# Patient Record
Sex: MALE | Race: WHITE | NOT HISPANIC OR LATINO
[De-identification: names, ages, dates, MRNs, and addresses within clinical notes are randomized per-mention and may not be internally consistent; named-entity substitution may affect disease eponyms.]

---

## 2021-05-19 PROBLEM — Z00.00 ENCOUNTER FOR PREVENTIVE HEALTH EXAMINATION: Status: ACTIVE | Noted: 2021-05-19

## 2021-06-02 ENCOUNTER — APPOINTMENT (OUTPATIENT)
Dept: HEART AND VASCULAR | Facility: CLINIC | Age: 71
End: 2021-06-02
Payer: MEDICARE

## 2021-06-02 DIAGNOSIS — F01.50 VASCULAR DEMENTIA W/OUT BEHAVIORAL DISTURBANCE: ICD-10-CM

## 2021-06-02 DIAGNOSIS — Z86.59 PERSONAL HISTORY OF OTHER MENTAL AND BEHAVIORAL DISORDERS: ICD-10-CM

## 2021-06-02 DIAGNOSIS — I35.0 NONRHEUMATIC AORTIC (VALVE) STENOSIS: ICD-10-CM

## 2021-06-02 PROCEDURE — 99213 OFFICE O/P EST LOW 20 MIN: CPT | Mod: 95

## 2021-06-02 NOTE — REVIEW OF SYSTEMS
[Fever] : no fever [SOB] : no shortness of breath [Dyspnea on exertion] : not dyspnea during exertion [Leg Claudication] : no intermittent leg claudication [Cough] : no cough [Abdominal Pain] : no abdominal pain [Change in Appetite] : no change in appetite [Urinary Frequency] : no change in urinary frequency [Joint Pain] : no joint pain [Rash] : no rash [Dizziness] : no dizziness [Convulsions] : no convulsions [Confusion] : confusion was observed [Memory Lapses Or Loss] : memory lapses or loss

## 2021-06-02 NOTE — HISTORY OF PRESENT ILLNESS
[FreeTextEntry1] : History obtained telehealth. Daughter 69 y/o M with dementia (moderate, told he has vascular dementia), HLD, HTN, severe aortic stenois referred for TAVR evaluation. Currently the patient is asymptomatic. The pt rarely does activity and is mostly 'sitting around the house' all day. His daughter was the one giving the history as pt unable to give a history. Pt has denied any symptoms and his daughter says that breathing is not an issue for the pt. No LH/syncope/chest pain.\par \par \par Medications:\par Aricept 10mg\par Diltizem 240mg ER\par Rosuvastatin 10mg\par Prozac 10mg\par Amlodipine 5mg daily

## 2021-06-02 NOTE — ASSESSMENT
[FreeTextEntry1] : 69 y/o M with dementia who presents with AS for evaluation. Currently, pt is NYHA 1, and his main limitation is neurologic status (per the daughter). Given that pt has no symptoms and limitation is dementia we discussed holding off any procedure at this point.\par -RTC in 1 month for in-person visit\par -medical management\par -F/u with Dr. Orellana

## 2021-07-19 ENCOUNTER — APPOINTMENT (OUTPATIENT)
Dept: HEART AND VASCULAR | Facility: CLINIC | Age: 71
End: 2021-07-19
Payer: MEDICARE

## 2021-07-19 VITALS
SYSTOLIC BLOOD PRESSURE: 151 MMHG | OXYGEN SATURATION: 98 % | BODY MASS INDEX: 24.71 KG/M2 | DIASTOLIC BLOOD PRESSURE: 80 MMHG | HEART RATE: 63 BPM | WEIGHT: 163 LBS | TEMPERATURE: 97.2 F | HEIGHT: 68 IN

## 2021-07-19 PROCEDURE — 99213 OFFICE O/P EST LOW 20 MIN: CPT

## 2021-07-19 NOTE — PHYSICAL EXAM
[Clear Lung Fields] : clear lung fields [Soft] : abdomen soft [No Rash] : no rash [Moves all extremities] : moves all extremities [Cognitive Impairment] : cognitive impairment [Well Developed] : well developed [Well Nourished] : well nourished [No Acute Distress] : no acute distress [Normal Venous Pressure] : normal venous pressure [No Carotid Bruit] : no carotid bruit [Normal Gait] : normal gait [No Edema] : no edema [No Focal Deficits] : no focal deficits [Normal Speech] : normal speech [de-identified] : +BEATRIZ with soft S2 [de-identified] : AAOx0, unable to provide history. Does not know where he is or who is accompanying him. However, pt is able to speak in full sentences

## 2021-07-19 NOTE — HISTORY OF PRESENT ILLNESS
[FreeTextEntry1] : 69 y/o M with dementia, HLD, HTN, severe aortic stenosis referred for TAVR evaluation. Currently the patient is asymptomatic. The pt rarely does activity and is mostly 'sitting around the house' all day. His daughter was the one giving the history as pt unable to give a history. Pt has denied any symptoms and his daughter says that breathing is not an issue for the pt. No LH/syncope/chest pain.\par His TTE in May showed an ALICE 0.5cm2, AV max PG 66mmHg with AV peak velocitiy 4 m/s.\par We had a visit in May over telehealth in which he was asymptomatic and today he presents for in-person visit with his daughter.\par Today pt denies any CP/SOB. His daughter states that his memory loss has worsened, and he no longer remembers his family members name, location or date.\par The pt is able to converse she states, but is confused and doesn’t know where he is. He still does yard-work with no SOB per the daughter. No syncope

## 2021-07-19 NOTE — ASSESSMENT
[FreeTextEntry1] : 70 y/o with severe dementia, chronic diastolic heart failure and severe AS, NYHA 1 symptoms.\par -due to his advanced dementia, I do not feel that the pt is a good TAVR candidate. At this point, he is asymptomatic and no intervention required.\par -Utility of BAV does not seem useful at this point, as it does not appear that his QOL is affected from his valvular disease\par -medical management at this point, and will re-assess symptoms. \par -we spoke about the disease and options for disease progression in great detail.

## 2021-07-19 NOTE — REVIEW OF SYSTEMS
[Memory Lapses Or Loss] : memory lapses or loss [Fever] : no fever [Blurry Vision] : no blurred vision [SOB] : no shortness of breath [Dyspnea on exertion] : not dyspnea during exertion [Chest Discomfort] : no chest discomfort [Cough] : no cough [Abdominal Pain] : no abdominal pain [Urinary Frequency] : no change in urinary frequency [Rash] : no rash [Dizziness] : no dizziness [Confusion] : confusion was observed [Easy Bleeding] : no tendency for easy bleeding

## 2022-11-09 ENCOUNTER — NON-APPOINTMENT (OUTPATIENT)
Age: 72
End: 2022-11-09

## 2022-11-09 ENCOUNTER — RESULT REVIEW (OUTPATIENT)
Age: 72
End: 2022-11-09

## 2022-11-11 ENCOUNTER — APPOINTMENT (OUTPATIENT)
Dept: CARDIOTHORACIC SURGERY | Facility: CLINIC | Age: 72
End: 2022-11-11

## 2022-11-11 ENCOUNTER — NON-APPOINTMENT (OUTPATIENT)
Age: 72
End: 2022-11-11

## 2022-11-11 VITALS
BODY MASS INDEX: 24.71 KG/M2 | SYSTOLIC BLOOD PRESSURE: 130 MMHG | TEMPERATURE: 97.8 F | HEIGHT: 68 IN | DIASTOLIC BLOOD PRESSURE: 80 MMHG | OXYGEN SATURATION: 98 % | HEART RATE: 73 BPM | WEIGHT: 163 LBS

## 2022-11-11 PROCEDURE — 99202 OFFICE O/P NEW SF 15 MIN: CPT

## 2022-11-14 RX ORDER — DILTIAZEM HYDROCHLORIDE 120 MG/1
120 CAPSULE, EXTENDED RELEASE ORAL
Qty: 30 | Refills: 0 | Status: ACTIVE | COMMUNITY

## 2022-11-14 RX ORDER — ROSUVASTATIN CALCIUM 10 MG/1
10 TABLET, FILM COATED ORAL DAILY
Refills: 0 | Status: ACTIVE | COMMUNITY

## 2022-11-14 RX ORDER — LORAZEPAM 2 MG/1
2 TABLET ORAL 3 TIMES DAILY
Refills: 0 | Status: ACTIVE | COMMUNITY

## 2022-11-14 RX ORDER — AMLODIPINE BESYLATE 5 MG/1
5 TABLET ORAL
Qty: 30 | Refills: 0 | Status: ACTIVE | COMMUNITY

## 2022-11-14 RX ORDER — FLUOXETINE HYDROCHLORIDE 20 MG/1
20 CAPSULE ORAL DAILY
Refills: 0 | Status: ACTIVE | COMMUNITY

## 2022-11-28 ENCOUNTER — TRANSCRIPTION ENCOUNTER (OUTPATIENT)
Age: 72
End: 2022-11-28

## 2022-11-28 ENCOUNTER — NON-APPOINTMENT (OUTPATIENT)
Age: 72
End: 2022-11-28

## 2022-11-28 VITALS
OXYGEN SATURATION: 99 % | RESPIRATION RATE: 18 BRPM | DIASTOLIC BLOOD PRESSURE: 69 MMHG | HEIGHT: 68 IN | TEMPERATURE: 97 F | WEIGHT: 162.92 LBS | HEART RATE: 67 BPM | SYSTOLIC BLOOD PRESSURE: 130 MMHG

## 2022-11-28 RX ORDER — RIVAROXABAN 15 MG-20MG
1 KIT ORAL
Qty: 0 | Refills: 0 | DISCHARGE

## 2022-11-28 NOTE — PATIENT PROFILE ADULT - FALL HARM RISK - HARM RISK INTERVENTIONS

## 2022-11-28 NOTE — PHYSICAL EXAM
[Well Developed] : well developed [Well Nourished] : well nourished [No Acute Distress] : no acute distress [Normal Venous Pressure] : normal venous pressure [No Carotid Bruit] : no carotid bruit [Clear Lung Fields] : clear lung fields [Soft] : abdomen soft [Normal Gait] : normal gait [No Edema] : no edema [No Rash] : no rash [Moves all extremities] : moves all extremities [No Focal Deficits] : no focal deficits [Normal Speech] : normal speech [Cognitive Impairment] : cognitive impairment [de-identified] : +BEATRIZ with soft S2 [de-identified] : AAOx0, unable to provide history. Does not know where he is or who is accompanying him. However, pt is able to speak in full sentences

## 2022-11-28 NOTE — HISTORY OF PRESENT ILLNESS
[FreeTextEntry1] : *History obtained from patient's wife \par \par 72 year old male with hypertension, hyperlipidemia, Dementia, Non-obstructive CAD, chronic diastolic heart failure with severe aortic stenosis who was referred for further evaluation of his valvular disease. \par \par Patient states he is feeling well and does admit to mild fatigue.  He denies any SOB at rest or with exertion, chest pain,  palpitations, dizziness, or syncope. \par \par ECHO on 10/18/2022-  severe aortic stenosis with peak gradient 111 mg/mean gradient 71 mmHg. \par \par The patient lives with his wife who assists him with all his ADLs.

## 2022-11-29 ENCOUNTER — APPOINTMENT (OUTPATIENT)
Dept: CARDIOTHORACIC SURGERY | Facility: HOSPITAL | Age: 72
End: 2022-11-29

## 2022-11-29 ENCOUNTER — INPATIENT (INPATIENT)
Facility: HOSPITAL | Age: 72
LOS: 0 days | Discharge: HOME | DRG: 266 | End: 2022-11-30
Attending: INTERNAL MEDICINE | Admitting: INTERNAL MEDICINE
Payer: MEDICARE

## 2022-11-29 ENCOUNTER — TRANSCRIPTION ENCOUNTER (OUTPATIENT)
Age: 72
End: 2022-11-29

## 2022-11-29 ENCOUNTER — NON-APPOINTMENT (OUTPATIENT)
Age: 72
End: 2022-11-29

## 2022-11-29 DIAGNOSIS — Z95.0 PRESENCE OF CARDIAC PACEMAKER: Chronic | ICD-10-CM

## 2022-11-29 LAB
A1C WITH ESTIMATED AVERAGE GLUCOSE RESULT: 6.3 % — HIGH (ref 4–5.6)
ALBUMIN SERPL ELPH-MCNC: 4.7 G/DL — SIGNIFICANT CHANGE UP (ref 3.3–5)
ALP SERPL-CCNC: 123 U/L — HIGH (ref 40–120)
ALT FLD-CCNC: 29 U/L — SIGNIFICANT CHANGE UP (ref 10–45)
ANION GAP SERPL CALC-SCNC: 8 MMOL/L — SIGNIFICANT CHANGE UP (ref 5–17)
ANION GAP SERPL CALC-SCNC: 9 MMOL/L — SIGNIFICANT CHANGE UP (ref 5–17)
APTT BLD: 30 SEC — SIGNIFICANT CHANGE UP (ref 27.5–35.5)
APTT BLD: 31.1 SEC — SIGNIFICANT CHANGE UP (ref 27.5–35.5)
AST SERPL-CCNC: 28 U/L — SIGNIFICANT CHANGE UP (ref 10–40)
BASE EXCESS BLDA CALC-SCNC: 0.8 MMOL/L — SIGNIFICANT CHANGE UP (ref -2–3)
BILIRUB SERPL-MCNC: 0.3 MG/DL — SIGNIFICANT CHANGE UP (ref 0.2–1.2)
BLD GP AB SCN SERPL QL: NEGATIVE — SIGNIFICANT CHANGE UP
BLD GP AB SCN SERPL QL: NEGATIVE — SIGNIFICANT CHANGE UP
BUN SERPL-MCNC: 11 MG/DL — SIGNIFICANT CHANGE UP (ref 7–23)
BUN SERPL-MCNC: 9 MG/DL — SIGNIFICANT CHANGE UP (ref 7–23)
CA-I BLDA-SCNC: 1.17 MMOL/L — SIGNIFICANT CHANGE UP (ref 1.15–1.33)
CALCIUM SERPL-MCNC: 8.7 MG/DL — SIGNIFICANT CHANGE UP (ref 8.4–10.5)
CALCIUM SERPL-MCNC: 9.8 MG/DL — SIGNIFICANT CHANGE UP (ref 8.4–10.5)
CHLORIDE SERPL-SCNC: 102 MMOL/L — SIGNIFICANT CHANGE UP (ref 96–108)
CHLORIDE SERPL-SCNC: 102 MMOL/L — SIGNIFICANT CHANGE UP (ref 96–108)
CO2 BLDA-SCNC: 26 MMOL/L — HIGH (ref 19–24)
CO2 SERPL-SCNC: 26 MMOL/L — SIGNIFICANT CHANGE UP (ref 22–31)
CO2 SERPL-SCNC: 28 MMOL/L — SIGNIFICANT CHANGE UP (ref 22–31)
COHGB MFR BLDA: 0.7 % — SIGNIFICANT CHANGE UP
CREAT SERPL-MCNC: 0.59 MG/DL — SIGNIFICANT CHANGE UP (ref 0.5–1.3)
CREAT SERPL-MCNC: 0.67 MG/DL — SIGNIFICANT CHANGE UP (ref 0.5–1.3)
EGFR: 103 ML/MIN/1.73M2 — SIGNIFICANT CHANGE UP
EGFR: 99 ML/MIN/1.73M2 — SIGNIFICANT CHANGE UP
ESTIMATED AVERAGE GLUCOSE: 134 MG/DL — HIGH (ref 68–114)
GLUCOSE BLDA-MCNC: 141 MG/DL — HIGH (ref 70–99)
GLUCOSE SERPL-MCNC: 140 MG/DL — HIGH (ref 70–99)
GLUCOSE SERPL-MCNC: 170 MG/DL — HIGH (ref 70–99)
HCO3 BLDA-SCNC: 25 MMOL/L — SIGNIFICANT CHANGE UP (ref 21–28)
HCT VFR BLD CALC: 36.1 % — LOW (ref 39–50)
HCT VFR BLD CALC: 41.8 % — SIGNIFICANT CHANGE UP (ref 39–50)
HGB BLD-MCNC: 12 G/DL — LOW (ref 13–17)
HGB BLD-MCNC: 13.8 G/DL — SIGNIFICANT CHANGE UP (ref 13–17)
HGB BLDA-MCNC: 11.2 G/DL — LOW (ref 12.6–17.4)
INR BLD: 0.97 — SIGNIFICANT CHANGE UP (ref 0.88–1.16)
INR BLD: 1.15 — SIGNIFICANT CHANGE UP (ref 0.88–1.16)
MAGNESIUM SERPL-MCNC: 1.8 MG/DL — SIGNIFICANT CHANGE UP (ref 1.6–2.6)
MAGNESIUM SERPL-MCNC: 2 MG/DL — SIGNIFICANT CHANGE UP (ref 1.6–2.6)
MCHC RBC-ENTMCNC: 30.3 PG — SIGNIFICANT CHANGE UP (ref 27–34)
MCHC RBC-ENTMCNC: 30.7 PG — SIGNIFICANT CHANGE UP (ref 27–34)
MCHC RBC-ENTMCNC: 33 GM/DL — SIGNIFICANT CHANGE UP (ref 32–36)
MCHC RBC-ENTMCNC: 33.2 GM/DL — SIGNIFICANT CHANGE UP (ref 32–36)
MCV RBC AUTO: 91.9 FL — SIGNIFICANT CHANGE UP (ref 80–100)
MCV RBC AUTO: 92.3 FL — SIGNIFICANT CHANGE UP (ref 80–100)
METHGB MFR BLDA: 1.3 % — SIGNIFICANT CHANGE UP
NRBC # BLD: 0 /100 WBCS — SIGNIFICANT CHANGE UP (ref 0–0)
NRBC # BLD: 0 /100 WBCS — SIGNIFICANT CHANGE UP (ref 0–0)
NT-PROBNP SERPL-SCNC: 337 PG/ML — HIGH (ref 0–300)
OXYHGB MFR BLDA: 97.1 % — HIGH (ref 90–95)
PCO2 BLDA: 39 MMHG — SIGNIFICANT CHANGE UP (ref 35–48)
PH BLDA: 7.42 — SIGNIFICANT CHANGE UP (ref 7.35–7.45)
PHOSPHATE SERPL-MCNC: 2.8 MG/DL — SIGNIFICANT CHANGE UP (ref 2.5–4.5)
PLATELET # BLD AUTO: 151 K/UL — SIGNIFICANT CHANGE UP (ref 150–400)
PLATELET # BLD AUTO: 204 K/UL — SIGNIFICANT CHANGE UP (ref 150–400)
PO2 BLDA: 144 MMHG — HIGH (ref 83–108)
POTASSIUM BLDA-SCNC: 3.4 MMOL/L — LOW (ref 3.5–5.1)
POTASSIUM SERPL-MCNC: 3.9 MMOL/L — SIGNIFICANT CHANGE UP (ref 3.5–5.3)
POTASSIUM SERPL-MCNC: 4.1 MMOL/L — SIGNIFICANT CHANGE UP (ref 3.5–5.3)
POTASSIUM SERPL-SCNC: 3.9 MMOL/L — SIGNIFICANT CHANGE UP (ref 3.5–5.3)
POTASSIUM SERPL-SCNC: 4.1 MMOL/L — SIGNIFICANT CHANGE UP (ref 3.5–5.3)
PROT SERPL-MCNC: 8.1 G/DL — SIGNIFICANT CHANGE UP (ref 6–8.3)
PROTHROM AB SERPL-ACNC: 11.5 SEC — SIGNIFICANT CHANGE UP (ref 10.5–13.4)
PROTHROM AB SERPL-ACNC: 13.7 SEC — HIGH (ref 10.5–13.4)
RBC # BLD: 3.91 M/UL — LOW (ref 4.2–5.8)
RBC # BLD: 4.55 M/UL — SIGNIFICANT CHANGE UP (ref 4.2–5.8)
RBC # FLD: 12.6 % — SIGNIFICANT CHANGE UP (ref 10.3–14.5)
RBC # FLD: 12.6 % — SIGNIFICANT CHANGE UP (ref 10.3–14.5)
RH IG SCN BLD-IMP: POSITIVE — SIGNIFICANT CHANGE UP
RH IG SCN BLD-IMP: POSITIVE — SIGNIFICANT CHANGE UP
SAO2 % BLDA: 99.1 % — HIGH (ref 94–98)
SODIUM BLDA-SCNC: 137 MMOL/L — SIGNIFICANT CHANGE UP (ref 136–145)
SODIUM SERPL-SCNC: 136 MMOL/L — SIGNIFICANT CHANGE UP (ref 135–145)
SODIUM SERPL-SCNC: 139 MMOL/L — SIGNIFICANT CHANGE UP (ref 135–145)
TSH SERPL-MCNC: 2.8 UIU/ML — SIGNIFICANT CHANGE UP (ref 0.27–4.2)
WBC # BLD: 11.24 K/UL — HIGH (ref 3.8–10.5)
WBC # BLD: 8.46 K/UL — SIGNIFICANT CHANGE UP (ref 3.8–10.5)
WBC # FLD AUTO: 11.24 K/UL — HIGH (ref 3.8–10.5)
WBC # FLD AUTO: 8.46 K/UL — SIGNIFICANT CHANGE UP (ref 3.8–10.5)

## 2022-11-29 PROCEDURE — 93306 TTE W/DOPPLER COMPLETE: CPT | Mod: 26

## 2022-11-29 PROCEDURE — 93010 ELECTROCARDIOGRAM REPORT: CPT

## 2022-11-29 PROCEDURE — 71045 X-RAY EXAM CHEST 1 VIEW: CPT | Mod: 26

## 2022-11-29 PROCEDURE — 99232 SBSQ HOSP IP/OBS MODERATE 35: CPT

## 2022-11-29 PROCEDURE — 33361 REPLACE AORTIC VALVE PERQ: CPT | Mod: 62,Q0

## 2022-11-29 PROCEDURE — 93308 TTE F-UP OR LMTD: CPT | Mod: 26

## 2022-11-29 DEVICE — INTRODUCER RAABE 6F X 55CM: Type: IMPLANTABLE DEVICE | Status: FUNCTIONAL

## 2022-11-29 DEVICE — WIRE ASAHI GRAND SLAM 300CM: Type: IMPLANTABLE DEVICE | Status: FUNCTIONAL

## 2022-11-29 DEVICE — GUIDEWIRE LUNDERQUIST EXTRA-STIFF DOUBLE CURVED .035" X 260CM: Type: IMPLANTABLE DEVICE | Status: FUNCTIONAL

## 2022-11-29 DEVICE — CATH TAVR EVOLUT FX 18FR 34MM: Type: IMPLANTABLE DEVICE | Status: FUNCTIONAL

## 2022-11-29 DEVICE — INTRO MICROPUNC 4FRX10CM SS: Type: IMPLANTABLE DEVICE | Status: FUNCTIONAL

## 2022-11-29 DEVICE — WIRE GD BMW UNIV II 190CM: Type: IMPLANTABLE DEVICE | Status: FUNCTIONAL

## 2022-11-29 DEVICE — IMPLANTABLE DEVICE: Type: IMPLANTABLE DEVICE | Status: FUNCTIONAL

## 2022-11-29 DEVICE — GUIDEWIRE AMPLATZ EXTRA-STIFF CURVED .035" X 260CM: Type: IMPLANTABLE DEVICE | Status: FUNCTIONAL

## 2022-11-29 DEVICE — GLDWIRE ADVANTAGE .035X260 CM: Type: IMPLANTABLE DEVICE | Status: FUNCTIONAL

## 2022-11-29 DEVICE — CATH DX AL1 INFIN 5FRX100CM: Type: IMPLANTABLE DEVICE | Status: FUNCTIONAL

## 2022-11-29 DEVICE — SHEATH INTRODUCER TERUMO PINNACLE CORONARY 8FR X 10CM X 0.038" MINI WIRE: Type: IMPLANTABLE DEVICE | Status: FUNCTIONAL

## 2022-11-29 DEVICE — VALVE TAVR EVOLUT FX 34MM: Type: IMPLANTABLE DEVICE | Status: FUNCTIONAL

## 2022-11-29 DEVICE — GWIRE GUID  0.035INX150CM: Type: IMPLANTABLE DEVICE | Status: FUNCTIONAL

## 2022-11-29 DEVICE — PACING CATH PACEL RIGHT HEART CURVE 5FR KIT: Type: IMPLANTABLE DEVICE | Status: FUNCTIONAL

## 2022-11-29 DEVICE — LOADING SYSTEM EVOLUT FX 34MM: Type: IMPLANTABLE DEVICE | Status: FUNCTIONAL

## 2022-11-29 DEVICE — WIREGUIDE TOROFLEX .018X40CM: Type: IMPLANTABLE DEVICE | Status: FUNCTIONAL

## 2022-11-29 DEVICE — CATH DX JR4 INFIN 5FRX100CM: Type: IMPLANTABLE DEVICE | Status: FUNCTIONAL

## 2022-11-29 DEVICE — INTRODUCER SHEATH KIT GLIDESHEATH SLENDER FLEX STRAIGHT 21G 6F X 10CM: Type: IMPLANTABLE DEVICE | Status: FUNCTIONAL

## 2022-11-29 DEVICE — WIRE GD BMW UNIV II 300CM: Type: IMPLANTABLE DEVICE | Status: FUNCTIONAL

## 2022-11-29 DEVICE — KIT PACE ELCTR BIPOLAR 5FR: Type: IMPLANTABLE DEVICE | Status: FUNCTIONAL

## 2022-11-29 DEVICE — BLLN TRUE DIALATION 22MMX4.5CM: Type: IMPLANTABLE DEVICE | Status: FUNCTIONAL

## 2022-11-29 DEVICE — CATH DX PIG 145 INFIN 5FRX110CM: Type: IMPLANTABLE DEVICE | Status: FUNCTIONAL

## 2022-11-29 DEVICE — INTRODUCER SHEATH DRYSEAL FLEX 18FR X 33CM: Type: IMPLANTABLE DEVICE | Status: FUNCTIONAL

## 2022-11-29 DEVICE — GUIDEWIRE STANDARD STRAIGHT .035" X 180CM: Type: IMPLANTABLE DEVICE | Status: FUNCTIONAL

## 2022-11-29 DEVICE — ANGIOSEAL VASC CLOS VIP 8FR: Type: IMPLANTABLE DEVICE | Status: FUNCTIONAL

## 2022-11-29 DEVICE — GUIDEWIRE TERUMO GLIDEWIRE ANGLED .035" X 150CM STANDARD: Type: IMPLANTABLE DEVICE | Status: FUNCTIONAL

## 2022-11-29 DEVICE — SUT PERCLOSE PROGLIDE 6FR: Type: IMPLANTABLE DEVICE | Status: FUNCTIONAL

## 2022-11-29 DEVICE — SHEATH INTRODUCER TERUMO PINNACLE CORONARY 6FR X 10CM X 0.038" MINI WIRE: Type: IMPLANTABLE DEVICE | Status: FUNCTIONAL

## 2022-11-29 DEVICE — GUIDEWIRE TERUMO GLIDEWIRE STIFF STRAGHT .035" X 180CM: Type: IMPLANTABLE DEVICE | Status: FUNCTIONAL

## 2022-11-29 DEVICE — SYS CEREBRAL PROT NCT04149535 SENTINEL FOR STUDY ONLY: Type: IMPLANTABLE DEVICE | Status: FUNCTIONAL

## 2022-11-29 DEVICE — INTRO GWIRE: Type: IMPLANTABLE DEVICE | Status: FUNCTIONAL

## 2022-11-29 RX ORDER — AMLODIPINE BESYLATE 2.5 MG/1
1 TABLET ORAL
Qty: 0 | Refills: 0 | DISCHARGE

## 2022-11-29 RX ORDER — POTASSIUM CHLORIDE 20 MEQ
20 PACKET (EA) ORAL ONCE
Refills: 0 | Status: COMPLETED | OUTPATIENT
Start: 2022-11-29 | End: 2022-11-29

## 2022-11-29 RX ORDER — CHOLECALCIFEROL (VITAMIN D3) 125 MCG
1 CAPSULE ORAL
Qty: 0 | Refills: 0 | DISCHARGE

## 2022-11-29 RX ORDER — ACETAMINOPHEN 500 MG
1000 TABLET ORAL ONCE
Refills: 0 | Status: COMPLETED | OUTPATIENT
Start: 2022-11-29 | End: 2022-11-29

## 2022-11-29 RX ORDER — MULTIVIT-MIN/FERROUS GLUCONATE 9 MG/15 ML
1 LIQUID (ML) ORAL
Qty: 0 | Refills: 0 | DISCHARGE

## 2022-11-29 RX ORDER — MAGNESIUM OXIDE 400 MG ORAL TABLET 241.3 MG
400 TABLET ORAL ONCE
Refills: 0 | Status: COMPLETED | OUTPATIENT
Start: 2022-11-29 | End: 2022-11-29

## 2022-11-29 RX ORDER — ATORVASTATIN CALCIUM 80 MG/1
40 TABLET, FILM COATED ORAL AT BEDTIME
Refills: 0 | Status: DISCONTINUED | OUTPATIENT
Start: 2022-11-29 | End: 2022-11-30

## 2022-11-29 RX ORDER — CEFAZOLIN SODIUM 1 G
2000 VIAL (EA) INJECTION EVERY 8 HOURS
Refills: 0 | Status: DISCONTINUED | OUTPATIENT
Start: 2022-11-29 | End: 2022-11-30

## 2022-11-29 RX ORDER — FLUOXETINE HCL 10 MG
1 CAPSULE ORAL
Qty: 0 | Refills: 0 | DISCHARGE

## 2022-11-29 RX ORDER — PANTOPRAZOLE SODIUM 20 MG/1
40 TABLET, DELAYED RELEASE ORAL
Refills: 0 | Status: DISCONTINUED | OUTPATIENT
Start: 2022-11-29 | End: 2022-11-30

## 2022-11-29 RX ORDER — CEFAZOLIN SODIUM 1 G
2000 VIAL (EA) INJECTION EVERY 8 HOURS
Refills: 0 | Status: DISCONTINUED | OUTPATIENT
Start: 2022-11-29 | End: 2022-11-29

## 2022-11-29 RX ORDER — DONEPEZIL HYDROCHLORIDE 10 MG/1
1 TABLET, FILM COATED ORAL
Qty: 0 | Refills: 0 | DISCHARGE

## 2022-11-29 RX ORDER — SENNA PLUS 8.6 MG/1
2 TABLET ORAL AT BEDTIME
Refills: 0 | Status: DISCONTINUED | OUTPATIENT
Start: 2022-11-29 | End: 2022-11-30

## 2022-11-29 RX ORDER — ASPIRIN/CALCIUM CARB/MAGNESIUM 324 MG
81 TABLET ORAL ONCE
Refills: 0 | Status: COMPLETED | OUTPATIENT
Start: 2022-11-29 | End: 2022-11-29

## 2022-11-29 RX ORDER — RIVAROXABAN 15 MG-20MG
20 KIT ORAL
Refills: 0 | Status: DISCONTINUED | OUTPATIENT
Start: 2022-11-30 | End: 2022-11-30

## 2022-11-29 RX ORDER — HEPARIN SODIUM 5000 [USP'U]/ML
5000 INJECTION INTRAVENOUS; SUBCUTANEOUS ONCE
Refills: 0 | Status: COMPLETED | OUTPATIENT
Start: 2022-11-29 | End: 2022-11-29

## 2022-11-29 RX ORDER — RIVAROXABAN 15 MG-20MG
1 KIT ORAL
Qty: 0 | Refills: 0 | DISCHARGE

## 2022-11-29 RX ORDER — DONEPEZIL HYDROCHLORIDE 10 MG/1
10 TABLET, FILM COATED ORAL AT BEDTIME
Refills: 0 | Status: DISCONTINUED | OUTPATIENT
Start: 2022-11-29 | End: 2022-11-30

## 2022-11-29 RX ORDER — FLUOXETINE HCL 10 MG
40 CAPSULE ORAL DAILY
Refills: 0 | Status: DISCONTINUED | OUTPATIENT
Start: 2022-11-29 | End: 2022-11-30

## 2022-11-29 RX ORDER — DILTIAZEM HCL 120 MG
1 CAPSULE, EXT RELEASE 24 HR ORAL
Qty: 0 | Refills: 0 | DISCHARGE

## 2022-11-29 RX ORDER — ROSUVASTATIN CALCIUM 5 MG/1
1 TABLET ORAL
Qty: 0 | Refills: 0 | DISCHARGE

## 2022-11-29 RX ADMIN — SENNA PLUS 2 TABLET(S): 8.6 TABLET ORAL at 21:28

## 2022-11-29 RX ADMIN — PANTOPRAZOLE SODIUM 40 MILLIGRAM(S): 20 TABLET, DELAYED RELEASE ORAL at 14:18

## 2022-11-29 RX ADMIN — Medication 81 MILLIGRAM(S): at 14:12

## 2022-11-29 RX ADMIN — Medication 1000 MILLIGRAM(S): at 17:04

## 2022-11-29 RX ADMIN — MAGNESIUM OXIDE 400 MG ORAL TABLET 400 MILLIGRAM(S): 241.3 TABLET ORAL at 14:18

## 2022-11-29 RX ADMIN — ATORVASTATIN CALCIUM 40 MILLIGRAM(S): 80 TABLET, FILM COATED ORAL at 21:28

## 2022-11-29 RX ADMIN — DONEPEZIL HYDROCHLORIDE 10 MILLIGRAM(S): 10 TABLET, FILM COATED ORAL at 21:28

## 2022-11-29 RX ADMIN — Medication 2 MILLIGRAM(S): at 21:28

## 2022-11-29 RX ADMIN — Medication 400 MILLIGRAM(S): at 16:51

## 2022-11-29 RX ADMIN — Medication 40 MILLIGRAM(S): at 14:11

## 2022-11-29 RX ADMIN — Medication 20 MILLIEQUIVALENT(S): at 14:18

## 2022-11-29 RX ADMIN — Medication 2000 MILLIGRAM(S): at 21:28

## 2022-11-29 RX ADMIN — HEPARIN SODIUM 5000 UNIT(S): 5000 INJECTION INTRAVENOUS; SUBCUTANEOUS at 17:09

## 2022-11-29 RX ADMIN — Medication 2000 MILLIGRAM(S): at 14:18

## 2022-11-29 NOTE — PRE-ANESTHESIA EVALUATION ADULT - NSANTHPEFT_GEN_ALL_CORE
lungs: clear  chest: pacemaker wound  cor: RRR S1S2 IV/VI BEATRIZ LUSB->carotids, apex  extr: no c/c/e

## 2022-11-29 NOTE — PROGRESS NOTE ADULT - ASSESSMENT
Plan:    Neurovascular:   -Hx of Dementia  -May give Ativan 2mg PRN for agitation   -Pain well controlled with current regimen. PRN's:    Cardiovascular:   -Aortic stenosis s/p TAVR Corevalve on 11/29/22  -Hx of HTN  -Hx of PPM placement   -Hemodynamically stable.   -Monitor: BP, HR, tele    Respiratory:   -Oxygenating well on room air  -Encourage continued use of IS 10x/hr and frequent ambulation  -CXR: stable, official read pending     GI:  -GI PPX:  -PO Diet  -Bowel Regimen:     Renal / :  -Continue to monitor renal function: BUN/Cr: 9/0.59  -Monitor I/O's daily     Endocrine:    -No hx of DM or thyroid dx  -A1c: pending  -TSH: pending    Hematologic:  -CBC: H/H- 12/36.1  -Coagulation Panel.    ID:  -Temperature: Afebrile  -CBC: WBC-11.24  -Continue to observe for SIRS/Sepsis Syndrome.    Prophylaxis:  -DVT prophylaxis with 5000 SubQ Heparin q8h.  -Continue with SCD's b/l while patient is at rest     Disposition:  -Discharge home once patient is medically ready   72 Male with PMHx of Dementia, HTN, HLD, Non-obstructive CAD, chronic diastolic HF, recent Medtronic PPM insertion, incidental PE (on Xarelto at home), severe aortic stenosis who was referred to Dr. Patterson for evaluation of his aortic stenosis. On 11/29/22 pt underwent TAVR Corevalve with Dr. Patterson without complications. Pt recovered on [facility] as mini-ICU with R TR band in place. Post-op labs and CXR stable, TTE pending.      Plan:    Neurovascular:   -Hx of Dementia  -Cont daily Donepezil   -Cont daily Prozac  -May give Ativan 2mg PRN for agitation   -Pain well controlled with current regimen. PRN's:     Cardiovascular:   -Aortic stenosis s/p TAVR Corevalve on 11/29/22  -ASA x 1 dose today  -Re-start home Xarelto tomorrow   -F/u PM TTE   -Hx of HTN  -Re-start home medications as indicated: Norvasc 5mg PO QD  -Hx of HLD  -Cont Atorvastatin  -Home med: Rosuvastatin   -Hx of PPM placement   -Hemodynamically stable.   -Monitor: BP, HR, tele    Respiratory:   -Hx of PE (incidental finding)  -Re-start home Xarelto tomorrow   -Oxygenating well on room air  -Encourage continued use of IS 10x/hr and frequent ambulation  -CXR: stable, official read pending     GI:  -GI PPX: protonix  -PO Diet  -Bowel Regimen: senna      Renal / :  -Continue to monitor renal function: BUN/Cr: 9/0.59  -Monitor I/O's daily     Endocrine:    -No hx of DM or thyroid dx  -A1c: pending  -TSH: pending    Hematologic:  -CBC: H/H- 12/36.1  -Coagulation Panel.    ID:  -Temperature: Afebrile  -CBC: WBC-11.24  -Continue to observe for SIRS/Sepsis Syndrome.    Prophylaxis:  -DVT prophylaxis with 5000 SubQ Heparin q8h.  -Continue with SCD's b/l while patient is at rest     Disposition:  -Discharge home tomorrow AM

## 2022-11-29 NOTE — DISCHARGE NOTE PROVIDER - NSDCCPTREATMENT_GEN_ALL_CORE_FT
PRINCIPAL PROCEDURE  Procedure: TAVR, percutaneous  Findings and Treatment: TAVR Corevalve 34mm, EF normal

## 2022-11-29 NOTE — H&P ADULT - NSHPREVIEWOFSYSTEMS_GEN_ALL_CORE
Review of Systems  CONSTITUTIONAL:  Denies Fevers / chills, sweats, fatigue, weight loss, weight gain                                      NEURO:  Denies changes in sensation, seizures, syncope, confusion                                                                            EYES:  Denies Blurry vision, discharge, pain, loss of vision                                                                                    ENMT:  Denies Difficulty hearing, vertigo, dysphagia, epistaxis, recent dental work                                       CV:  Denies Chest pain, palpitations, SIMMONS, orthopnea                                                                                          RESPIRATORY:  Denies Wheezing, SOB, cough / sputum, hemoptysis                                                                GI:  Denies Nausea, vomiting, diarrhea, constipation, melena, difficulty swallowing                                               : Denies Hematuria, dysuria, urgency, incontinence                                                                                         MUSCULOSKELETAL:  Denies arthritis, joint swelling, muscle weakness                                                             SKIN/BREAST:  Denies rash, itching, hair loss, masses                                                                                            PSYCH:  Denies depression, anxiety, suicidal ideation                                                                                               HEME/LYMPH:  Denies bruises easily, enlarged lymph nodes, tender lymph nodes                                        ENDOCRINE:  Denies cold intolerance, heat intolerance, polydipsia

## 2022-11-29 NOTE — H&P ADULT - NSICDXPASTMEDICALHX_GEN_ALL_CORE_FT
PAST MEDICAL HISTORY:  Aortic valve stenosis     CAD (coronary artery disease)     Chronic diastolic heart failure     Dementia     HLD (hyperlipidemia)     HTN (hypertension)

## 2022-11-29 NOTE — DISCHARGE NOTE PROVIDER - NSDCMRMEDTOKEN_GEN_ALL_CORE_FT
amLODIPine 5 mg oral tablet: 1 tab(s) orally once a day  Centrum oral tablet: 1 tab(s) orally 3 times a week  dilTIAZem 120 mg/24 hours oral tablet, extended release: 1 tab(s) orally once a day  donepezil 10 mg oral tablet: 1 tab(s) orally once a day (at bedtime)  FLUoxetine 40 mg oral capsule: 1 cap(s) orally once a day  LORazepam 2 mg oral tablet: 1 tab(s) orally 3 times a day, As Needed  rosuvastatin 10 mg oral tablet: 1 tab(s) orally once a day  Vitamin D3 25 mcg (1000 intl units) oral capsule: 1 cap(s) orally once a day  Xarelto 20 mg oral tablet: 1 tab(s) orally once a day (in the evening)

## 2022-11-29 NOTE — DISCHARGE NOTE PROVIDER - NSDCFUADDAPPT_GEN_ALL_CORE_FT
-Please attend your discharge appointments  -Please attend your discharge appointments. Our office will call you with your appointment times, if you do not hear from the office by 12/1, please call [phone] for your appointment. It is essential to follow up with us.

## 2022-11-29 NOTE — BRIEF OPERATIVE NOTE - NSICDXBRIEFPROCEDURE_GEN_ALL_CORE_FT
PROCEDURES:  TAVR, percutaneous 29-Nov-2022 08:24:46  Shalini Duff   PROCEDURES:  TAVR, percutaneous 29-Nov-2022 08:24:46 TAVR Corevalve 34mm, EF normal Shalini Duff

## 2022-11-29 NOTE — DISCHARGE NOTE PROVIDER - CARE PROVIDERS DIRECT ADDRESSES
[email or web address]

## 2022-11-29 NOTE — H&P ADULT - HISTORY OF PRESENT ILLNESS
72 M with a PMHx of Dementia, HTN, HLD, Non-obstructive CAD, chronic diastolic HF, severe aortic stenosis. Pt referred to Dr. Patterson for evalutation of his aortic stenosis and deemed a good surgical candidate. On 11/29/22 pt presents today for planned TAVR. On arrival,  72 M with a PMHx of Dementia, HTN, HLD, Non-obstructive CAD, chronic diastolic HF, severe aortic stenosis. Pt referred to Dr. Patterson for evalutation of his aortic stenosis and deemed a good surgical candidate. On 11/29/22 pt presents today for planned TAVR. On arrival, pt accompanied by wife at the bedside, and pt feels well. Denies any chest pain, palpitations, orthopnea, dyspnea on exertion, shortness of breath, wheezing, abd pain, nausea, vomiting, constipation, lightheadedness, headaches, fevers, or chills.    Patient seen in same day holding area; Reports no changes to PMHx or medications since last seen by our team. Denies acute or current SOB, chest pain, palpitation, N/V/D, fever/chills, recent illness, or any other concerning symptoms. Last took Eliquis on Saturday, pt wife states pt is not on this medication normally and was only on it for PPM insertion.    72 M with a PMHx of Dementia, HTN, HLD, Non-obstructive CAD, chronic diastolic HF, recent Medtronic PPM insertion, severe aortic stenosis. Pt referred to Dr. Patterson for evalutation of his aortic stenosis and deemed a good surgical candidate. On 11/29/22 pt presents today for planned TAVR. On arrival, pt accompanied by wife at the bedside, and pt feels well. Denies any chest pain, palpitations, orthopnea, dyspnea on exertion, shortness of breath, wheezing, abd pain, nausea, vomiting, constipation, lightheadedness, headaches, fevers, or chills.    Patient seen in same day holding area; Reports no changes to PMHx or medications since last seen by our team. Denies acute or current SOB, chest pain, palpitation, N/V/D, fever/chills, recent illness, or any other concerning symptoms. Last took Eliquis on Saturday, pt wife states pt is not on this medication normally and was only on it for PPM insertion.    72 M with a PMHx of Dementia, HTN, HLD, Non-obstructive CAD, chronic diastolic HF, recent Medtronic PPM insertion, incidental PE (on xarelto at home), severe aortic stenosis. Pt referred to Dr. Patterson for evalutation of his aortic stenosis and deemed a good surgical candidate. On 11/29/22 pt presents today for planned TAVR. On arrival, pt accompanied by wife at the bedside, and pt feels well. Denies any chest pain, palpitations, orthopnea, dyspnea on exertion, shortness of breath, wheezing, abd pain, nausea, vomiting, constipation, lightheadedness, headaches, fevers, or chills.    Patient seen in same day holding area; Reports no changes to PMHx or medications since last seen by our team. Denies acute or current SOB, chest pain, palpitation, N/V/D, fever/chills, recent illness, or any other concerning symptoms. Last took Eliquis on Saturday,  for small PE

## 2022-11-29 NOTE — BRIEF OPERATIVE NOTE - OPERATION/FINDINGS
Right Groin:  Left Groin:  Right radial: Right Groin:  Left Groin:  Right radial: sentinal device Right Groin: 6F arterial, perclosex1  Left Groin: 8F venous, manual pressure - 18F arterial, angiosealx1, perclosex3  Right radial: 6F arterial (used for sentinal device)

## 2022-11-29 NOTE — H&P ADULT - NSHPPHYSICALEXAM_GEN_ALL_CORE
PHYSICAL EXAM:  General: well appearing sitting in chair in NAD   Neurological: AOx3. Motor skills grossly intact  Cardiovascular: Regular rate/rhythm, No murmurs  Respiratory: Lungs CTA bilaterally. No wheezing or rales  Gastrointestinal: +BS in all 4 quadrants. Non-distended. Soft. Non-tender  Extremities: No edema. No calf tenderness.  Vascular: Radial 2+bilaterally, DP 2+ b/l  Incision Sites: recent PPM insertion site healing well

## 2022-11-29 NOTE — PROGRESS NOTE ADULT - SUBJECTIVE AND OBJECTIVE BOX
Patient discussed on morning rounds with Dr. Ruby    Operation / Date: TAVR Corevalve on 11/29/22    SUBJECTIVE ASSESSMENT: Patient seen and examined at bedside. Patient A&O x 0, but offers no complaints and denies pain. Wife at bedside states that patient's mental status is at baseline.     Vital Signs Last 24 Hrs  T(C): 36.6 (29 Nov 2022 10:30), Max: 36.6 (29 Nov 2022 10:30)  T(F): 97.9 (29 Nov 2022 10:30), Max: 97.9 (29 Nov 2022 10:30)  HR: 60 (29 Nov 2022 12:00) (60 - 67)  BP: 115/56 (29 Nov 2022 12:00) (101/54 - 130/69)  BP(mean): 81 (29 Nov 2022 12:00) (54 - 82)  RR: 16 (29 Nov 2022 12:00) (16 - 18)  SpO2: 96% (29 Nov 2022 12:00) (94% - 99%)    Parameters below as of 29 Nov 2022 13:00  Patient On (Oxygen Delivery Method): room air    I&O's Detail    29 Nov 2022 07:01  -  29 Nov 2022 12:14  --------------------------------------------------------  IN:  Total IN: 0 mL    OUT:    Voided (mL): 250 mL  Total OUT: 250 mL    Total NET: -250 mL    CHEST TUBE: None  CHUCKIE DRAIN:  None  EPICARDIAL WIRES: None  TIE DOWNS: None  CASTRO: Removed  TR Band: R radial in place    PHYSICAL EXAM:  GENERAL: NAD, lying supine in bed comfortably   HEAD:  Atraumatic, Normocephalic  EYES: EOMI, PERRLA, conjunctiva and sclera clear  ENT: Moist mucous membranes  NECK: Supple, No JVD  CHEST/LUNG: CTAB; No rales, rhonchi, wheezing, or rubs. Unlabored respirations. Incision to upper left chest well-approximated with dermabond  HEART: Regular rate and rhythm; No murmurs, rubs, or gallops  ABDOMEN: Bowel sounds present; Soft, Nontender, Nondistended. No hepatomegally  EXTREMITIES:  2+ Peripheral Pulses, brisk capillary refill. No clubbing, cyanosis, or edema  GROINS: bilateral groin dressings C/D/I, no evidence of bleeding or hematoma formation  NERVOUS SYSTEM:  Alert & Oriented X0, at baseline per wife     LABS:                        12.0   11.24 )-----------( 151      ( 29 Nov 2022 10:30 )             36.1     PT/INR - ( 29 Nov 2022 10:30 )   PT: 13.7 sec;   INR: 1.15        PTT - ( 29 Nov 2022 10:30 )  PTT:30.0 sec    11-29    136  |  102  |  9   ----------------------------<  170<H>  3.9   |  26  |  0.59    Ca    8.7      29 Nov 2022 10:30  Phos  2.8     11-29  Mg     1.8     11-29    TPro  8.1  /  Alb  4.7  /  TBili  0.3  /  DBili  x   /  AST  28  /  ALT  29  /  AlkPhos  123<H>  11-29    MEDICATIONS  (STANDING):  aspirin enteric coated 81 milliGRAM(s) Oral once  atorvastatin 40 milliGRAM(s) Oral at bedtime  ceFAZolin  Injectable. 2000 milliGRAM(s) IV Push every 8 hours  donepezil 10 milliGRAM(s) Oral at bedtime  FLUoxetine 40 milliGRAM(s) Oral daily  heparin   Injectable 5000 Unit(s) SubCutaneous once  magnesium oxide 400 milliGRAM(s) Oral once  pantoprazole    Tablet 40 milliGRAM(s) Oral before breakfast  pantoprazole    Tablet 40 milliGRAM(s) Oral before breakfast  potassium chloride   Powder 20 milliEquivalent(s) Oral once  senna 2 Tablet(s) Oral at bedtime    MEDICATIONS  (PRN):    RADIOLOGY & ADDITIONAL TESTS: CXR: atelectasis, no evidence of pleural effusion or PTX, pending official read    Echo pending

## 2022-11-29 NOTE — PRE-ANESTHESIA EVALUATION ADULT - NSANTHPMHFT_GEN_ALL_CORE
72 M with a PMHx of Dementia, HTN, HLD, Non-obstructive CAD, chronic diastolic HF, severe aortic stenosis. Pt referred to Dr. Patterson for evalutation of his aortic stenosis and deemed a good surgical candidate. On 11/29/22 pt presents today for planned TAVR

## 2022-11-29 NOTE — DISCHARGE NOTE PROVIDER - CARE PROVIDER_API CALL
Ahmet Ruby)  Cardiology; Interventional Cardiology  [address] [place] [address]  Phone: [phone]  Fax: [phone]  Follow Up Time:     Sol Patterson)  Cardiovascular Disease; Internal Medicine; Interventional Cardiology  [address]  [place] [address]  Phone: [phone]  Fax: [phone]  Follow Up Time:

## 2022-11-29 NOTE — DISCHARGE NOTE PROVIDER - HOSPITAL COURSE
72 M with a PMHx of Dementia, HTN, HLD, Non-obstructive CAD, chronic diastolic HF, recent Medtronic PPM insertion, incidental PE (on xarelto at home), severe aortic stenosis. Pt referred to Dr. Patterson for evalutation of his aortic stenosis and deemed a good surgical candidate. On 11/29/22 pt arrived as SDA and underwent TAVR Corevalve, EF normal. Intraoperative course uneventful and patient arrived to 9 under ICU care. POD1 ___________ and per Dr. Ruby patient is medically ready to be discharged home.     CARDIAC SURGERY DISCHARGE CHECKLIST:       TAVR Valve        [ ] Aspirin, [ x ] Contraindicated, Reason____Xarelto Only  per Dr. Patterson ___        [ ] Plavix, [ x ] Contraindicated, Reason ___Xarelto Only per Dr. Patterson ___        Anticoagulation        [ x] NOAC, Reason ___Pulmonary Embolism/TAVR_____              Cost/Insurance barriers addressed: YES - already on at home    72 M with a PMHx of Dementia, HTN, HLD, Non-obstructive CAD, chronic diastolic HF, recent Medtronic PPM insertion, incidental PE (on xarelto at home), severe aortic stenosis. Pt referred to Dr. Patterson for evalutation of his aortic stenosis and deemed a good surgical candidate. On 11/29/22 pt arrived as SDA and underwent TAVR Corevalve, EF normal. Intraoperative course uneventful and patient arrived to  under ICU care. POD1 pt is doing well, ambulated without difficutly and urinated multiple times. Feels ready to leave the hospital and per Dr. Ruby patient is medically ready to be discharged home.     CARDIAC SURGERY DISCHARGE CHECKLIST:       TAVR Valve        [ ] Aspirin, [ x ] Contraindicated, Reason____Xarelto Only  per Dr. Patterson ___        [ ] Plavix, [ x ] Contraindicated, Reason ___Xarelto Only per Dr. Patterson ___        Anticoagulation        [ x] NOAC, Reason ___Pulmonary Embolism/TAVR_____              Cost/Insurance barriers addressed: YES - already on at home, insurance doesn't accept Eliquis

## 2022-11-30 ENCOUNTER — TRANSCRIPTION ENCOUNTER (OUTPATIENT)
Age: 72
End: 2022-11-30

## 2022-11-30 VITALS
OXYGEN SATURATION: 99 % | RESPIRATION RATE: 18 BRPM | DIASTOLIC BLOOD PRESSURE: 63 MMHG | HEART RATE: 98 BPM | SYSTOLIC BLOOD PRESSURE: 134 MMHG

## 2022-11-30 LAB
ANION GAP SERPL CALC-SCNC: 11 MMOL/L — SIGNIFICANT CHANGE UP (ref 5–17)
BUN SERPL-MCNC: 12 MG/DL — SIGNIFICANT CHANGE UP (ref 7–23)
CALCIUM SERPL-MCNC: 8.9 MG/DL — SIGNIFICANT CHANGE UP (ref 8.4–10.5)
CHLORIDE SERPL-SCNC: 106 MMOL/L — SIGNIFICANT CHANGE UP (ref 96–108)
CO2 SERPL-SCNC: 23 MMOL/L — SIGNIFICANT CHANGE UP (ref 22–31)
CREAT SERPL-MCNC: 0.71 MG/DL — SIGNIFICANT CHANGE UP (ref 0.5–1.3)
EGFR: 97 ML/MIN/1.73M2 — SIGNIFICANT CHANGE UP
GLUCOSE SERPL-MCNC: 138 MG/DL — HIGH (ref 70–99)
HCT VFR BLD CALC: 31.5 % — LOW (ref 39–50)
HCV AB S/CO SERPL IA: 0.04 S/CO — SIGNIFICANT CHANGE UP
HCV AB SERPL-IMP: SIGNIFICANT CHANGE UP
HGB BLD-MCNC: 10.6 G/DL — LOW (ref 13–17)
MAGNESIUM SERPL-MCNC: 1.7 MG/DL — SIGNIFICANT CHANGE UP (ref 1.6–2.6)
MCHC RBC-ENTMCNC: 30.5 PG — SIGNIFICANT CHANGE UP (ref 27–34)
MCHC RBC-ENTMCNC: 33.7 GM/DL — SIGNIFICANT CHANGE UP (ref 32–36)
MCV RBC AUTO: 90.8 FL — SIGNIFICANT CHANGE UP (ref 80–100)
NRBC # BLD: 0 /100 WBCS — SIGNIFICANT CHANGE UP (ref 0–0)
PLATELET # BLD AUTO: 146 K/UL — LOW (ref 150–400)
POTASSIUM SERPL-MCNC: 3.6 MMOL/L — SIGNIFICANT CHANGE UP (ref 3.5–5.3)
POTASSIUM SERPL-SCNC: 3.6 MMOL/L — SIGNIFICANT CHANGE UP (ref 3.5–5.3)
RBC # BLD: 3.47 M/UL — LOW (ref 4.2–5.8)
RBC # FLD: 12.8 % — SIGNIFICANT CHANGE UP (ref 10.3–14.5)
SODIUM SERPL-SCNC: 140 MMOL/L — SIGNIFICANT CHANGE UP (ref 135–145)
WBC # BLD: 7.23 K/UL — SIGNIFICANT CHANGE UP (ref 3.8–10.5)
WBC # FLD AUTO: 7.23 K/UL — SIGNIFICANT CHANGE UP (ref 3.8–10.5)

## 2022-11-30 PROCEDURE — 83880 ASSAY OF NATRIURETIC PEPTIDE: CPT

## 2022-11-30 PROCEDURE — 80053 COMPREHEN METABOLIC PANEL: CPT

## 2022-11-30 PROCEDURE — 71045 X-RAY EXAM CHEST 1 VIEW: CPT

## 2022-11-30 PROCEDURE — 84443 ASSAY THYROID STIM HORMONE: CPT

## 2022-11-30 PROCEDURE — 86923 COMPATIBILITY TEST ELECTRIC: CPT

## 2022-11-30 PROCEDURE — C1725: CPT

## 2022-11-30 PROCEDURE — 71045 X-RAY EXAM CHEST 1 VIEW: CPT | Mod: 26

## 2022-11-30 PROCEDURE — 85610 PROTHROMBIN TIME: CPT

## 2022-11-30 PROCEDURE — 99238 HOSP IP/OBS DSCHRG MGMT 30/<: CPT

## 2022-11-30 PROCEDURE — 93306 TTE W/DOPPLER COMPLETE: CPT

## 2022-11-30 PROCEDURE — 86850 RBC ANTIBODY SCREEN: CPT

## 2022-11-30 PROCEDURE — 86900 BLOOD TYPING SEROLOGIC ABO: CPT

## 2022-11-30 PROCEDURE — C1894: CPT

## 2022-11-30 PROCEDURE — 83036 HEMOGLOBIN GLYCOSYLATED A1C: CPT

## 2022-11-30 PROCEDURE — 36415 COLL VENOUS BLD VENIPUNCTURE: CPT

## 2022-11-30 PROCEDURE — C1769: CPT

## 2022-11-30 PROCEDURE — 93010 ELECTROCARDIOGRAM REPORT: CPT

## 2022-11-30 PROCEDURE — 86803 HEPATITIS C AB TEST: CPT

## 2022-11-30 PROCEDURE — 93005 ELECTROCARDIOGRAM TRACING: CPT

## 2022-11-30 PROCEDURE — 85027 COMPLETE CBC AUTOMATED: CPT

## 2022-11-30 PROCEDURE — C1760: CPT

## 2022-11-30 PROCEDURE — 83735 ASSAY OF MAGNESIUM: CPT

## 2022-11-30 PROCEDURE — C1889: CPT

## 2022-11-30 PROCEDURE — 82803 BLOOD GASES ANY COMBINATION: CPT

## 2022-11-30 PROCEDURE — 85730 THROMBOPLASTIN TIME PARTIAL: CPT

## 2022-11-30 PROCEDURE — C1884: CPT

## 2022-11-30 PROCEDURE — C1887: CPT

## 2022-11-30 PROCEDURE — 84100 ASSAY OF PHOSPHORUS: CPT

## 2022-11-30 PROCEDURE — 80048 BASIC METABOLIC PNL TOTAL CA: CPT

## 2022-11-30 PROCEDURE — 86901 BLOOD TYPING SEROLOGIC RH(D): CPT

## 2022-11-30 RX ADMIN — PANTOPRAZOLE SODIUM 40 MILLIGRAM(S): 20 TABLET, DELAYED RELEASE ORAL at 06:16

## 2022-11-30 RX ADMIN — Medication 2000 MILLIGRAM(S): at 06:16

## 2022-11-30 NOTE — PROGRESS NOTE ADULT - ASSESSMENT
D/C Instructions:  Ahmet Ruby)  Cardiology; Interventional Cardiology  [address] [place] [address]  Phone: [phone]  Fax: [phone]  Follow Up Time:     Sol Patterson)  Cardiovascular Disease; Internal Medicine; Interventional Cardiology  [address]  [place] [address]  Phone: [phone]  Fax: [phone]  Follow Up Time:    -Please attend your discharge appointments. Our office will call you with your appointment times, if you do not hear from the office by 12/1, please call [phone] for your appointment. It is essential to follow up with us.    -Regular Diet - No restrictions    -No heavy lifting/straining, Showering allowed, Stairs allowed, Walking - Indoors allowed, Walking - Outdoors allowed, Follow Instructions Provided by your Surgical Team  -Walk daily as tolerated and use your incentive spirometer 10 times every hour while you are awake.     -Please weigh yourself daily. If you notice over a 3 pound weight gain in 3 days, this is a sign you are likely retaining too much fluid. It is imperative you call our right away with unexplained rapid weight gain.      -Please continue to wear the compression stockings given to you in the hospital at home. This is a way to prevent fluid from building up in your legs.     -No driving or strenuous activity/exercise until cleared by your surgeon.    -Gently clean your incisions with unscented/antibacterial soap and water, pat dry.  You may leave them open to air.    -Call your doctor if you have shortness of breath, chest pain not relieved by pain medication, dizziness, fever >100.5, or increased redness or drainage from incisions.

## 2022-11-30 NOTE — PROGRESS NOTE ADULT - SUBJECTIVE AND OBJECTIVE BOX
Patient discussed on morning rounds with Dr. Ruby    Operation / Date: 11/29/22 -- TAVR    Surgeon/Attending MD: Dr. Ruby, Dr. Patterson    SUBJECTIVE ASSESSMENT: Pt is feeling is well, no complaints. No eating/drinking. Denies any chest pain, palpitations, orthopnea, dyspnea on exertion, shortness of breath, wheezing, abd pain, nausea, vomiting, constipation, lightheadedness, headaches, fevers, or chills.    HOSPITAL COURSE:  72 M with a PMHx of Dementia, HTN, HLD, Non-obstructive CAD, chronic diastolic HF, recent Medtronic PPM insertion, incidental PE (on xarelto at home), severe aortic stenosis. Pt referred to Dr. Patterson for evalutation of his aortic stenosis and deemed a good surgical candidate. On 11/29/22 pt arrived as SDA and underwent TAVR Corevalve, EF normal. Intraoperative course uneventful and patient arrived to  under ICU care. POD1 pt is doing well, ambulated without difficutly and urinated multiple times. Feels ready to leave the hospital and per Dr. Ruby patient is medically ready to be discharged home.     CARDIAC SURGERY DISCHARGE CHECKLIST:       TAVR Valve        [ ] Aspirin, [ x ] Contraindicated, Reason____Xarelto Only  per Dr. Patterson ___        [ ] Plavix, [ x ] Contraindicated, Reason ___Xarelto Only per Dr. Patterson ___        Anticoagulation        [ x] NOAC, Reason ___Pulmonary Embolism/TAVR_____              Cost/Insurance barriers addressed: YES - already on at home, insurance doesn't accept Eliquis     VITALS:  Vital Signs Last 24 Hrs  T(C): 36.6 (30 Nov 2022 04:32), Max: 36.8 (29 Nov 2022 17:01)  T(F): 97.8 (30 Nov 2022 04:32), Max: 98.2 (29 Nov 2022 17:01)  HR: 98 (30 Nov 2022 07:00) (60 - 120)  BP: 134/63 (30 Nov 2022 07:00) (99/44 - 142/65)  BP(mean): 91 (30 Nov 2022 07:00) (54 - 93)  RR: 18 (30 Nov 2022 07:00) (16 - 18)  SpO2: 99% (30 Nov 2022 07:00) (94% - 99%)    Parameters below as of 30 Nov 2022 08:00  Patient On (Oxygen Delivery Method): room air    EPICARDIAL WIRES REMOVED: n/a  TIE DOWNS REMOVED: n/a    PHYSICAL EXAM:  General: well appearing sitting in chair in NAD   Neurological: AOx3. Motor skills grossly intact  Cardiovascular: Normal S1/S2. Regular rate/rhythm. No murmurs  Respiratory: Lungs CTA bilaterally. No wheezing or rales  Gastrointestinal: +BS in all 4 quadrants. Non-distended. Soft. Non-tender  Extremities: Strength 5/5 b/l upper/lower extremities. Sensation grossly intact upper/lower extremities. No edema. No calf tenderness.  Vascular: Radial 2+bilaterally, DP 2+ b/l  Incision Sites: b/l groin incision without erythema, purulence or ecchymosis.       LABS:             12.0   11.24 )-----------( 151      ( 29 Nov 2022 10:30 )             36.1     PT/INR - ( 29 Nov 2022 10:30 )   PT: 13.7 sec;   INR: 1.15    PTT - ( 29 Nov 2022 10:30 )  PTT:30.0 sec    11-29    136  |  102  |  9   ----------------------------<  170<H>  3.9   |  26  |  0.59    Ca    8.7      29 Nov 2022 10:30  Phos  2.8     11-29  Mg     1.8     11-29    TPro  8.1  /  Alb  4.7  /  TBili  0.3  /  DBili  x   /  AST  28  /  ALT  29  /  AlkPhos  123<H>  11-29      CARDIAC SURGERY DISCHARGE CHECKLIST:       TAVR Valve        [ ] Aspirin, [ x ] Contraindicated, Reason____Xarelto Only  per Dr. Patterson ___        [ ] Plavix, [ x ] Contraindicated, Reason ___Xarelto Only per Dr. Patterson ___        Anticoagulation        [ x] NOAC, Reason ___Pulmonary Embolism/TAVR_____              Cost/Insurance barriers addressed: YES - already on at home, insurance doesn't accept Eliquis      Patient discussed on morning rounds with Dr. Ruby    Operation / Date: 11/29/22 -- TAVR    Surgeon/Attending MD: Dr. Ruby, Dr. Patterson    SUBJECTIVE ASSESSMENT: Pt is feeling is well, no complaints. No eating/drinking. Denies any chest pain, palpitations, orthopnea, dyspnea on exertion, shortness of breath, wheezing, abd pain, nausea, vomiting, constipation, lightheadedness, headaches, fevers, or chills.    HOSPITAL COURSE:  72 M with a PMHx of Dementia, HTN, HLD, Non-obstructive CAD, chronic diastolic HF, recent Medtronic PPM insertion, incidental PE (on xarelto at home), severe aortic stenosis. Pt referred to Dr. Patterson for evalutation of his aortic stenosis and deemed a good surgical candidate. On 11/29/22 pt arrived as SDA and underwent TAVR Corevalve, EF normal. Intraoperative course uneventful and patient arrived to  under ICU care. POD1 pt is doing well, ambulated without difficutly and urinated multiple times. Feels ready to leave the hospital and per Dr. Ruby patient is medically ready to be discharged home.     Of note: did not prescribe any refills of medications as wife confirmed she had enough of all patients home medications at home and did not need any more, this includes Xarelto.     CARDIAC SURGERY DISCHARGE CHECKLIST:       TAVR Valve        [ ] Aspirin, [ x ] Contraindicated, Reason____Xarelto Only  per Dr. Patterson ___        [ ] Plavix, [ x ] Contraindicated, Reason ___Xarelto Only per Dr. Patterson ___        Anticoagulation        [ x] NOAC, Reason ___Pulmonary Embolism/TAVR_____              Cost/Insurance barriers addressed: YES - already on at home, insurance doesn't accept Eliquis     VITALS:  Vital Signs Last 24 Hrs  T(C): 36.6 (30 Nov 2022 04:32), Max: 36.8 (29 Nov 2022 17:01)  T(F): 97.8 (30 Nov 2022 04:32), Max: 98.2 (29 Nov 2022 17:01)  HR: 98 (30 Nov 2022 07:00) (60 - 120)  BP: 134/63 (30 Nov 2022 07:00) (99/44 - 142/65)  BP(mean): 91 (30 Nov 2022 07:00) (54 - 93)  RR: 18 (30 Nov 2022 07:00) (16 - 18)  SpO2: 99% (30 Nov 2022 07:00) (94% - 99%)    Parameters below as of 30 Nov 2022 08:00  Patient On (Oxygen Delivery Method): room air    EPICARDIAL WIRES REMOVED: n/a  TIE DOWNS REMOVED: n/a    PHYSICAL EXAM:  General: well appearing sitting in chair in NAD   Neurological: AOx3. Motor skills grossly intact  Cardiovascular: Normal S1/S2. Regular rate/rhythm. No murmurs  Respiratory: Lungs CTA bilaterally. No wheezing or rales  Gastrointestinal: +BS in all 4 quadrants. Non-distended. Soft. Non-tender  Extremities: Strength 5/5 b/l upper/lower extremities. Sensation grossly intact upper/lower extremities. No edema. No calf tenderness.  Vascular: Radial 2+bilaterally, DP 2+ b/l  Incision Sites: b/l groin incision without erythema, purulence or ecchymosis.       LABS:             12.0   11.24 )-----------( 151      ( 29 Nov 2022 10:30 )             36.1     PT/INR - ( 29 Nov 2022 10:30 )   PT: 13.7 sec;   INR: 1.15    PTT - ( 29 Nov 2022 10:30 )  PTT:30.0 sec    11-29    136  |  102  |  9   ----------------------------<  170<H>  3.9   |  26  |  0.59    Ca    8.7      29 Nov 2022 10:30  Phos  2.8     11-29  Mg     1.8     11-29    TPro  8.1  /  Alb  4.7  /  TBili  0.3  /  DBili  x   /  AST  28  /  ALT  29  /  AlkPhos  123<H>  11-29      CARDIAC SURGERY DISCHARGE CHECKLIST:       TAVR Valve        [ ] Aspirin, [ x ] Contraindicated, Reason____Xarelto Only  per Dr. Patterson ___        [ ] Plavix, [ x ] Contraindicated, Reason ___Xarelto Only per Dr. Patterson ___        Anticoagulation        [ x] NOAC, Reason ___Pulmonary Embolism/TAVR_____              Cost/Insurance barriers addressed: YES - already on at home, insurance doesn't accept Eliquis      Patient discussed on morning rounds with Dr. Ruby    Operation / Date: 11/29/22 -- TAVR    Surgeon/Attending MD: Dr. Ruby, Dr. Patterson    SUBJECTIVE ASSESSMENT: Pt is feeling is well, no complaints. No eating/drinking. Denies any chest pain, palpitations, orthopnea, dyspnea on exertion, shortness of breath, wheezing, abd pain, nausea, vomiting, constipation, lightheadedness, headaches, fevers, or chills.    HOSPITAL COURSE:  72 M with a PMHx of Dementia, HTN, HLD, Non-obstructive CAD, chronic diastolic HF, recent Medtronic PPM insertion, incidental PE (on xarelto at home), severe aortic stenosis. Pt referred to Dr. Patterson for evalutation of his aortic stenosis and deemed a good surgical candidate. On 11/29/22 pt arrived as SDA and underwent TAVR Corevalve, EF normal. Intraoperative course uneventful and patient arrived to  under ICU care. POD1 pt is doing well, ambulated without difficutly and urinated multiple times. Feels ready to leave the hospital and per Dr. Ruby patient is medically ready to be discharged home.     Of note: did not prescribe any refills of medications as wife confirmed she had enough of all patients home medications at home and did not need any more, this includes Xarelto. Per Dr. Landry, no need for labs this morning as patient was agitated throughout the night and don't want to upset the patient with another lab draw. Previously labs from day prior stable post-op.     CARDIAC SURGERY DISCHARGE CHECKLIST:       TAVR Valve        [ ] Aspirin, [ x ] Contraindicated, Reason____Xarelto Only  per Dr. Patterson ___        [ ] Plavix, [ x ] Contraindicated, Reason ___Xarelto Only per Dr. Patterson ___        Anticoagulation        [ x] NOAC, Reason ___Pulmonary Embolism/TAVR_____              Cost/Insurance barriers addressed: YES - already on at home, insurance doesn't accept Eliquis     VITALS:  Vital Signs Last 24 Hrs  T(C): 36.6 (30 Nov 2022 04:32), Max: 36.8 (29 Nov 2022 17:01)  T(F): 97.8 (30 Nov 2022 04:32), Max: 98.2 (29 Nov 2022 17:01)  HR: 98 (30 Nov 2022 07:00) (60 - 120)  BP: 134/63 (30 Nov 2022 07:00) (99/44 - 142/65)  BP(mean): 91 (30 Nov 2022 07:00) (54 - 93)  RR: 18 (30 Nov 2022 07:00) (16 - 18)  SpO2: 99% (30 Nov 2022 07:00) (94% - 99%)    Parameters below as of 30 Nov 2022 08:00  Patient On (Oxygen Delivery Method): room air    EPICARDIAL WIRES REMOVED: n/a  TIE DOWNS REMOVED: n/a    PHYSICAL EXAM:  General: well appearing sitting in chair in NAD   Neurological: AOx3. Motor skills grossly intact  Cardiovascular: Normal S1/S2. Regular rate/rhythm. No murmurs  Respiratory: Lungs CTA bilaterally. No wheezing or rales  Gastrointestinal: +BS in all 4 quadrants. Non-distended. Soft. Non-tender  Extremities: Strength 5/5 b/l upper/lower extremities. Sensation grossly intact upper/lower extremities. No edema. No calf tenderness.  Vascular: Radial 2+bilaterally, DP 2+ b/l  Incision Sites: b/l groin incision without erythema, purulence or ecchymosis.       LABS:             12.0   11.24 )-----------( 151      ( 29 Nov 2022 10:30 )             36.1     PT/INR - ( 29 Nov 2022 10:30 )   PT: 13.7 sec;   INR: 1.15    PTT - ( 29 Nov 2022 10:30 )  PTT:30.0 sec    11-29    136  |  102  |  9   ----------------------------<  170<H>  3.9   |  26  |  0.59    Ca    8.7      29 Nov 2022 10:30  Phos  2.8     11-29  Mg     1.8     11-29    TPro  8.1  /  Alb  4.7  /  TBili  0.3  /  DBili  x   /  AST  28  /  ALT  29  /  AlkPhos  123<H>  11-29      CARDIAC SURGERY DISCHARGE CHECKLIST:       TAVR Valve        [ ] Aspirin, [ x ] Contraindicated, Reason____Xarelto Only  per Dr. Patterson ___        [ ] Plavix, [ x ] Contraindicated, Reason ___Xarelto Only per Dr. Patterson ___        Anticoagulation        [ x] NOAC, Reason ___Pulmonary Embolism/TAVR_____              Cost/Insurance barriers addressed: YES - already on at home, insurance doesn't accept Eliquis

## 2022-11-30 NOTE — DISCHARGE NOTE NURSING/CASE MANAGEMENT/SOCIAL WORK - PATIENT PORTAL LINK FT
You can access the FollowMyHealth Patient Portal offered by [facility] by registering at the following website: [email or web address]. By joining [employer]’s FollowMyHealth portal, you will also be able to view your health information using other applications (apps) compatible with our system.

## 2022-11-30 NOTE — DISCHARGE NOTE NURSING/CASE MANAGEMENT/SOCIAL WORK - NSDCFUADDAPPT_GEN_ALL_CORE_FT
-Please attend your discharge appointments. Our office will call you with your appointment times, if you do not hear from the office by 12/1, please call [phone] for your appointment. It is essential to follow up with us.

## 2022-12-01 ENCOUNTER — APPOINTMENT (OUTPATIENT)
Dept: CARE COORDINATION | Facility: HOME HEALTH | Age: 72
End: 2022-12-01

## 2022-12-01 VITALS
DIASTOLIC BLOOD PRESSURE: 58 MMHG | SYSTOLIC BLOOD PRESSURE: 118 MMHG | HEART RATE: 72 BPM | OXYGEN SATURATION: 96 % | RESPIRATION RATE: 14 BRPM

## 2022-12-01 DIAGNOSIS — Z95.2 PRESENCE OF PROSTHETIC HEART VALVE: ICD-10-CM

## 2022-12-01 PROBLEM — I50.32 CHRONIC DIASTOLIC (CONGESTIVE) HEART FAILURE: Chronic | Status: ACTIVE | Noted: 2022-11-28

## 2022-12-01 PROBLEM — E78.5 HYPERLIPIDEMIA, UNSPECIFIED: Chronic | Status: ACTIVE | Noted: 2022-11-28

## 2022-12-01 PROBLEM — I10 ESSENTIAL (PRIMARY) HYPERTENSION: Chronic | Status: ACTIVE | Noted: 2022-11-28

## 2022-12-01 PROBLEM — I35.0 NONRHEUMATIC AORTIC (VALVE) STENOSIS: Chronic | Status: ACTIVE | Noted: 2022-11-28

## 2022-12-01 PROBLEM — F03.90 UNSPECIFIED DEMENTIA WITHOUT BEHAVIORAL DISTURBANCE: Chronic | Status: ACTIVE | Noted: 2022-11-28

## 2022-12-01 PROBLEM — I25.10 ATHEROSCLEROTIC HEART DISEASE OF NATIVE CORONARY ARTERY WITHOUT ANGINA PECTORIS: Chronic | Status: ACTIVE | Noted: 2022-11-28

## 2022-12-01 RX ORDER — UBIDECARENONE/VIT E ACET 100MG-5
25 MCG CAPSULE ORAL
Refills: 0 | Status: ACTIVE | COMMUNITY
Start: 2022-12-01

## 2022-12-01 RX ORDER — DONEPEZIL HYDROCHLORIDE 10 MG/1
10 TABLET ORAL
Qty: 90 | Refills: 0 | Status: ACTIVE | COMMUNITY
Start: 2022-08-04

## 2022-12-01 RX ORDER — RIVAROXABAN 20 MG/1
20 TABLET, FILM COATED ORAL
Qty: 30 | Refills: 0 | Status: ACTIVE | COMMUNITY
Start: 2022-11-15

## 2022-12-01 RX ORDER — DILTIAZEM HYDROCHLORIDE 120 MG/1
120 CAPSULE, EXTENDED RELEASE ORAL
Qty: 90 | Refills: 0 | Status: COMPLETED | COMMUNITY
Start: 2022-09-06

## 2022-12-01 RX ORDER — MULTIVITAMIN/IRON/FOLIC ACID 18MG-0.4MG
TABLET ORAL DAILY
Refills: 0 | Status: ACTIVE | COMMUNITY
Start: 2022-12-01

## 2022-12-01 NOTE — REVIEW OF SYSTEMS
[Confused] : confusion [Convulsions] : no convulsions [Dizziness] : no dizziness [Fainting] : no fainting [Limb Weakness] : no limb weakness [Difficulty Walking] : no difficulty walking [Negative] : Musculoskeletal

## 2022-12-01 NOTE — HISTORY OF PRESENT ILLNESS
[FreeTextEntry1] : 72 M with a PMHx of Dementia, HTN, HLD, Non-obstructive CAD, chronic diastolic\par HF, recent Medtronic PPM insertion, incidental PE (on xarelto at home), severe\par aortic stenosis. Pt referred to Dr. Patterson for evalutation of his aortic stenosis\par and deemed a good surgical candidate. On 11/29/22 pt arrived as SDA and\par underwent TAVR Corevalve, EF normal. Intraoperative course uneventful and\par patient arrived to  under ICU care. POD1 pt is doing well, ambulated without\par difficutly and urinated multiple times. Feels ready to leave the hospital and\par per Dr. Ruby patient is medically ready to be discharged home.\par \par Pt is recovering at home with his wife Nuvia, she is the primary caregiver. Pt is ambulating independently but needs assistance with ADL's. \par Hx VAscular dementia at baseline he is alert and confused, uses 1-2 word answers occasionally.\par Pt using incentive spirometer. \par Last BM today\par [facility] to be initiated. \par

## 2022-12-01 NOTE — PHYSICAL EXAM
[Sclera] : the sclera and conjunctiva were normal [PERRL With Normal Accommodation] : pupils were equal in size, round, and reactive to light [Neck Appearance] : the appearance of the neck was normal [Respiration, Rhythm And Depth] : normal respiratory rhythm and effort [Exaggerated Use Of Accessory Muscles For Inspiration] : no accessory muscle use [Auscultation Breath Sounds / Voice Sounds] : lungs were clear to auscultation bilaterally [Apical Impulse] : the apical impulse was normal [Heart Rate And Rhythm] : heart rate was normal and rhythm regular [Heart Sounds] : normal S1 and S2 [Heart Sounds Gallop] : no gallops [Murmurs] : no murmurs [Heart Sounds Pericardial Friction Rub] : no pericardial rub [Examination Of The Chest] : the chest was normal in appearance [Chest Visual Inspection Thoracic Asymmetry] : no chest asymmetry [Diminished Respiratory Excursion] : normal chest expansion [2+] : left 2+ [Bowel Sounds] : normal bowel sounds [Abdomen Soft] : soft [Abdomen Tenderness] : non-tender [Skin Color & Pigmentation] : normal skin color and pigmentation [Skin Turgor] : normal skin turgor [] : no rash [Mood] : the mood was normal [FreeTextEntry1] : Pt alert and confused to person, place, and time.

## 2022-12-05 DIAGNOSIS — I11.0 HYPERTENSIVE HEART DISEASE WITH HEART FAILURE: ICD-10-CM

## 2022-12-05 DIAGNOSIS — Z95.0 PRESENCE OF CARDIAC PACEMAKER: ICD-10-CM

## 2022-12-05 DIAGNOSIS — I35.0 NONRHEUMATIC AORTIC (VALVE) STENOSIS: ICD-10-CM

## 2022-12-05 DIAGNOSIS — Z00.6 ENCOUNTER FOR EXAMINATION FOR NORMAL COMPARISON AND CONTROL IN CLINICAL RESEARCH PROGRAM: ICD-10-CM

## 2022-12-05 DIAGNOSIS — Z79.01 LONG TERM (CURRENT) USE OF ANTICOAGULANTS: ICD-10-CM

## 2022-12-05 DIAGNOSIS — I26.99 OTHER PULMONARY EMBOLISM WITHOUT ACUTE COR PULMONALE: ICD-10-CM

## 2022-12-05 DIAGNOSIS — E78.5 HYPERLIPIDEMIA, UNSPECIFIED: ICD-10-CM

## 2022-12-05 DIAGNOSIS — I25.10 ATHEROSCLEROTIC HEART DISEASE OF NATIVE CORONARY ARTERY WITHOUT ANGINA PECTORIS: ICD-10-CM

## 2022-12-05 DIAGNOSIS — F03.90 UNSPECIFIED DEMENTIA WITHOUT BEHAVIORAL DISTURBANCE: ICD-10-CM

## 2022-12-05 DIAGNOSIS — I50.32 CHRONIC DIASTOLIC (CONGESTIVE) HEART FAILURE: ICD-10-CM

## 2022-12-15 ENCOUNTER — NON-APPOINTMENT (OUTPATIENT)
Age: 72
End: 2022-12-15

## 2023-01-03 ENCOUNTER — TRANSCRIPTION ENCOUNTER (OUTPATIENT)
Age: 73
End: 2023-01-03

## 2024-12-22 NOTE — H&P ADULT - ASSESSMENT
72 M with a PMHx of Dementia, HTN, HLD, Non-obstructive CAD, chronic diastolic HF, severe aortic stenosis. Pt referred to Dr. Patterson for evalutation of his aortic stenosis and deemed a good surgical candidate. On 11/29/22 pt presents today for planned TAVR. On arrival, pt accompanied by wife at the bedside, and pt feels well. Denies any chest pain, palpitations, orthopnea, dyspnea on exertion, shortness of breath, wheezing, abd pain, nausea, vomiting, constipation, lightheadedness, headaches, fevers, or chills.    Plan:   -admit to 9[facility] ICU post-procedure  -post of ECHO  -antiplatelet plan TBD post-procedure  -has own PPM     Dispo: Admit to 9[facility] ICU care Applied 72 M with a PMHx of Dementia, HTN, HLD, Non-obstructive CAD, chronic diastolic HF, recent Medtronic PPM insertion, severe aortic stenosis. Pt referred to Dr. Patterson for evalutation of his aortic stenosis and deemed a good surgical candidate. On 11/29/22 pt presents today for planned TAVR. On arrival, pt accompanied by wife at the bedside, and pt feels well. Denies any chest pain, palpitations, orthopnea, dyspnea on exertion, shortness of breath, wheezing, abd pain, nausea, vomiting, constipation, lightheadedness, headaches, fevers, or chills.    Plan:   -admit to 9[facility] ICU post-procedure  -post of ECHO  -antiplatelet plan TBD post-procedure  -has own PPM     Dispo: Admit to 9[facility] ICU care 72 M with a PMHx of Dementia, HTN, HLD, Non-obstructive CAD, chronic diastolic HF, recent Medtronic PPM insertion, incidental PE finding (on xarelto) severe aortic stenosis. Pt referred to Dr. Patterson for evalutation of his aortic stenosis and deemed a good surgical candidate. On 11/29/22 pt presents today for planned TAVR. On arrival, pt accompanied by wife at the bedside, and pt feels well. Denies any chest pain, palpitations, orthopnea, dyspnea on exertion, shortness of breath, wheezing, abd pain, nausea, vomiting, constipation, lightheadedness, headaches, fevers, or chills.    Plan:   -admit to 9[facility] ICU post-procedure  -post of ECHO  -ASA Today, start Xarelto tomorrow (Will be xarelto only moving forward)  -has own PPM     Dispo: Admit to 9[facility] ICU care

## (undated) DEVICE — BAND RADIAL COMPRESSION DEVICE REG 24CM

## (undated) DEVICE — DRAPE ULTRASOUND TRANSDUCER COVER

## (undated) DEVICE — SYS DEL CONTROLLER ANGIOTOUCH

## (undated) DEVICE — PACING CABLE (BROWN) A/V TEMP SCREW DOWN 12FT

## (undated) DEVICE — NDL PERCU ECHOTIP 21G X 4CM

## (undated) DEVICE — MANIFOLD ANGIO AUTOMD TRNDCR

## (undated) DEVICE — WARMING BLANKET FULL UNDERBODY

## (undated) DEVICE — DRSG QUICKCLOT HEMOSTATIC 4X4 FOIL

## (undated) DEVICE — TUBING EXTENSION HI PRESSURE FLEX 48"

## (undated) DEVICE — SYR MED A2000 SYRINGE KIT ACIST REUS

## (undated) DEVICE — PACK HYBRID LHH